# Patient Record
Sex: FEMALE
[De-identification: names, ages, dates, MRNs, and addresses within clinical notes are randomized per-mention and may not be internally consistent; named-entity substitution may affect disease eponyms.]

---

## 2018-07-05 ENCOUNTER — HOSPITAL ENCOUNTER (OUTPATIENT)
Dept: HOSPITAL 88 - CT | Age: 78
End: 2018-07-05
Attending: UROLOGY
Payer: MEDICARE

## 2018-07-05 DIAGNOSIS — N26.9: Primary | ICD-10-CM

## 2018-07-05 DIAGNOSIS — N95.2: ICD-10-CM

## 2018-07-05 DIAGNOSIS — N18.9: ICD-10-CM

## 2018-07-05 DIAGNOSIS — N13.30: ICD-10-CM

## 2018-07-05 PROCEDURE — 78708 K FLOW/FUNCT IMAGE W/DRUG: CPT

## 2018-07-05 PROCEDURE — 74176 CT ABD & PELVIS W/O CONTRAST: CPT

## 2018-07-05 NOTE — DIAGNOSTIC IMAGING REPORT
PROCEDURE: CT ABDOMEN AND PELVIS WITHOUT CONTRAST

 

TECHNIQUE: 

The abdomen and pelvis were scanned utilizing a multidetector helical 

scanner from the diaphragm to the lesser trochanter without IV or oral 

contrast material. Coronal and sagittal multiplanar reformations were 

obtained.

 

COMPARISON:   [facility], CT, CTA ABD/PELVIS WOW, 

5/22/2017. The patient states that she has had a prior CT scan in 

February 2018 but that is not available to compare with.

 

INDICATIONS:   HYDRO, ATROPHIC KIDNEY

 

FINDINGS:

 

ABSENCE OF INTRAVENOUS CONTRAST DECREASES SENSITIVITY FOR DETECTION OF 

FOCAL LESIONS AND VASCULAR PATHOLOGY.

 

LOWER THORAX: The heart is enlarged with diffuse coronary artery 

calcification.

 

HEPATOBILIARY: Nodular shrunken appearing liver compatible with 

cirrhosis. Left hepatic lobe cyst again noted. Varices are present 

around the spleen. The gallbladder is absent.

SPLEEN: No splenomegaly.

PANCREAS: No focal masses or ductal dilatation.

 

ADRENALS: No adrenal nodules.

KIDNEYS/URETERS: No hydronephrosis or stones. Left kidney has a maximal 

length of 8.8 cm. Right kidney has a maximal length of 6.6 cm. Left 

kidney previously measured 7.6 cm and the right kidney size is 

unchanged.

PELVIC ORGANS/BLADDER: Unremarkable.

 

PERITONEUM / RETROPERITONEUM: No free air or fluid.

LYMPH NODES: No lymphadenopathy.

VESSELS: There is an aortobiiliac stent graft present. There was a 

previously described endoleak. Embolization coils are noted posterior 

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 Of 

note is the sac has increased in size now measuring 6.8 x 8.1 cm 

(previously measured 5.2 x 7 cm). This increase in size of the sac 

signifies likely continued endoleak as the cause. Exact location or 

type of an endoleak cannot be ascertained without IV contrast material.

 

GI TRACT: No distention or wall thickening.

 

BONES AND SOFT TISSUES: Unremarkable. 

 

IMPRESSION:

 

1. 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 

continued endoleak.

2. No hydronephrosis or renal stones. Both kidneys are atrophic.

3. Nodular shrunken cirrhotic appearing liver with perisplenic 

varices.. 

 

 

Ramone Rubi D.O.  

Dictated by:  Ramone Rubi D.O. on 7/05/2018 at 10:34     

Electronically approved by:  Ramone Rubi D.O. on 7/05/2018 at 10:34

## 2018-07-05 NOTE — DIAGNOSTIC IMAGING REPORT
Renal Scan with Lasix Washout



Clinical information: 77 F with renal sclerosis; patient currently not on

dialysis.



Comparison: Most recent prior renal scan of 7/7/2017



Technique: Following intravenous administration of 10 mCi of Tc-99m MAG3,

dynamic images of the kidneys in the posterior projection were obtained through

40 minutes.  Lasix 40 mg was administered intravenously at 10 minutes post

injection of the tracer.



Report:



Left kidney: Perfusion of the left kidney is prompt.  The kidney has a reniform

shape with smooth contours but is quite small.  Extraction of tracer from the

blood pool is decreased.  Clearance of tracer from the renal parenchyma begins

promptly but is not complete by the end of the study.  The pelvicalyceal system

is not dilated although a dilated calyx is present in the upper pole.  Mildly

increased pooling of tracer is seen within the dilated calyx and renal pelvis. 

No net drainage of tracer from the pelvicalyceal system is seen prior to

administration of Lasix.  Washout of tracer from the pelvicalyceal system

following administration of Lasix is rapid with a T-1/2 of 6 minutes (normal

less than 15 minutes).  No significant stasis of tracer is seen within the left

ureter.



Right kidney: Perfusion to the right kidney is prompt.  The right kidney sits

much higher in the abdomen than the left kidney; this is related to the

patient's marked elevated right hemidiaphragm.  The right kidney has semilunar

shape, again with smooth contours.  Extraction of tracer by the renal

parenchyma is decreased.  Clearance of tracer from the renal parenchyma is

minimal. The pelvicalyceal system cannot be assessed because no significant

tracer pools within the system.  The tracer that is cleared from the renal

parenchyma drains adequately.  The renogram trends upward throughout the study

because of the extent of retention in the renal parenchyma that continues

throughout the imaging time.  No washout is seen following administration of

Lasix.  No stasis of tracer is seen in the right ureter.



Differential renal function: The left kidney contributes 56% of total renal

function and the right kidney contributes 44% (normal 43-57%), previously left

58% and right 42%.



Impression: 



1. The left kidney shows evidence of severe medical renal disease.  No

hydronephrosis is seen and there is no physiologically significant obstruction

of the renal collecting system.  The appearance and washout pattern of the

kidney are unchanged compared to the prior study of 7/7/2017.  The differential

renal function is unchanged.



2. The right kidney shows evidence of severe medical renal disease.  Although

the differential renal function is 44% and unchanged from the prior study of

7/7/2017, the differential function is only a measure of renal parenchyma that

extracts tracer.  The excretory function of the right kidney is considerably

worse than in the left kidney.  No hydronephrosis is present and no

physiologically significant obstruction of the renal collecting system is

suspected.  Overall, the appearance and function of the kidney is not

significantly different from the prior study of a year ago.



Signed by: Dr. Yarelis Hayes M.D. on 7/5/2018 8:26 PM